# Patient Record
Sex: FEMALE | Race: WHITE | NOT HISPANIC OR LATINO | Employment: FULL TIME | URBAN - METROPOLITAN AREA
[De-identification: names, ages, dates, MRNs, and addresses within clinical notes are randomized per-mention and may not be internally consistent; named-entity substitution may affect disease eponyms.]

---

## 2021-12-08 ENCOUNTER — OFFICE VISIT (OUTPATIENT)
Dept: FAMILY MEDICINE | Facility: CLINIC | Age: 26
End: 2021-12-08
Payer: OTHER GOVERNMENT

## 2021-12-08 VITALS
SYSTOLIC BLOOD PRESSURE: 100 MMHG | HEIGHT: 66 IN | BODY MASS INDEX: 19.77 KG/M2 | DIASTOLIC BLOOD PRESSURE: 65 MMHG | HEART RATE: 74 BPM | OXYGEN SATURATION: 100 % | TEMPERATURE: 98 F | WEIGHT: 123 LBS

## 2021-12-08 DIAGNOSIS — Z11.4 ENCOUNTER FOR SCREENING FOR HIV: ICD-10-CM

## 2021-12-08 DIAGNOSIS — R53.83 FATIGUE, UNSPECIFIED TYPE: ICD-10-CM

## 2021-12-08 DIAGNOSIS — Z00.00 ANNUAL PHYSICAL EXAM: Primary | ICD-10-CM

## 2021-12-08 DIAGNOSIS — Z11.59 ENCOUNTER FOR HEPATITIS C SCREENING TEST FOR LOW RISK PATIENT: ICD-10-CM

## 2021-12-08 PROCEDURE — 99385 PR PREVENTIVE VISIT,NEW,18-39: ICD-10-PCS | Mod: S$GLB,,, | Performed by: NURSE PRACTITIONER

## 2021-12-08 PROCEDURE — 99385 PREV VISIT NEW AGE 18-39: CPT | Mod: S$GLB,,, | Performed by: NURSE PRACTITIONER

## 2023-10-02 DIAGNOSIS — M54.12 BRACHIAL NEURITIS: Primary | ICD-10-CM

## 2023-10-02 DIAGNOSIS — R53.1 ASTHENIA: ICD-10-CM

## 2023-10-19 ENCOUNTER — HOSPITAL ENCOUNTER (OUTPATIENT)
Dept: RADIOLOGY | Facility: HOSPITAL | Age: 28
Discharge: HOME | End: 2023-10-19
Attending: NURSE PRACTITIONER
Payer: OTHER GOVERNMENT

## 2023-10-19 DIAGNOSIS — M54.12 BRACHIAL NEURITIS: ICD-10-CM

## 2023-10-19 DIAGNOSIS — R53.1 ASTHENIA: ICD-10-CM

## 2023-10-19 PROCEDURE — 72141 MRI NECK SPINE W/O DYE: CPT | Mod: TC

## 2023-10-19 PROCEDURE — 72141 MRI NECK SPINE W/O DYE: CPT | Mod: 26,,, | Performed by: RADIOLOGY

## 2023-10-19 PROCEDURE — 72141 MRI CERVICAL SPINE WITHOUT CONTRAST: ICD-10-PCS | Mod: 26,,, | Performed by: RADIOLOGY

## 2023-12-13 ENCOUNTER — OCCUPATIONAL HEALTH (OUTPATIENT)
Dept: URGENT CARE | Facility: CLINIC | Age: 28
End: 2023-12-13

## 2023-12-13 PROCEDURE — 80305 PR COLLECTION ONLY DRUG SCREEN: ICD-10-PCS | Mod: S$GLB,,,

## 2023-12-13 PROCEDURE — 80305 DRUG TEST PRSMV DIR OPT OBS: CPT | Mod: S$GLB,,,

## 2024-04-26 ENCOUNTER — OFFICE VISIT (OUTPATIENT)
Dept: FAMILY MEDICINE | Facility: CLINIC | Age: 29
End: 2024-04-26
Payer: OTHER GOVERNMENT

## 2024-04-26 VITALS
HEIGHT: 66 IN | DIASTOLIC BLOOD PRESSURE: 68 MMHG | SYSTOLIC BLOOD PRESSURE: 118 MMHG | BODY MASS INDEX: 20.23 KG/M2 | HEART RATE: 75 BPM | TEMPERATURE: 98 F | OXYGEN SATURATION: 98 % | WEIGHT: 125.88 LBS

## 2024-04-26 DIAGNOSIS — R23.2 HOT FLASHES: ICD-10-CM

## 2024-04-26 DIAGNOSIS — Z00.00 PHYSICAL EXAM: Primary | ICD-10-CM

## 2024-04-26 PROCEDURE — 99999 PR PBB SHADOW E&M-EST. PATIENT-LVL III: CPT | Mod: PBBFAC,,, | Performed by: FAMILY MEDICINE

## 2024-04-26 PROCEDURE — 99395 PREV VISIT EST AGE 18-39: CPT | Mod: S$PBB,,, | Performed by: FAMILY MEDICINE

## 2024-04-26 PROCEDURE — 99213 OFFICE O/P EST LOW 20 MIN: CPT | Mod: PBBFAC,PN | Performed by: FAMILY MEDICINE

## 2024-04-26 NOTE — PROGRESS NOTES
Subjective     Patient ID: Nena Fong is a 29 y.o. female.    Chief Complaint: Annual Exam    Nena Fong is a 29 y.o. female who presents for physical exam and hot flashes. No tobacco or alcohol use. Exercises daily. 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 Fhx of hypothyroidism (mother). Immunizations are UTD. Pap smear performed last year at [facility], unable to see results. Taking prenatal vitamin currently, trying to conceive. No prior pregnancies. LMP 4/24/2026. Cycle 26-28 days. Has hot flashes during beginning of menstrual cycle. In general is in good health. BMI is 20. No wheezing or SOB. No chest pain or palpitations. No N/V/D. Left shoulder entrapment last fall, did physical therapy, better now with no current concern. Had tongue laceration surgery in 2002. Had wisdom teeth removed.      Review of Systems   Respiratory:  Negative for shortness of breath and wheezing.    Cardiovascular:  Negative for chest pain and palpitations.   Gastrointestinal:  Negative for diarrhea, nausea and vomiting.   All other systems reviewed and are negative.         Objective     Physical Exam  Vitals reviewed.   HENT:      Right Ear: Tympanic membrane normal.      Left Ear: Tympanic membrane normal.      Mouth/Throat:      Mouth: Mucous membranes are moist.   Cardiovascular:      Rate and Rhythm: Normal rate and regular rhythm.      Pulses:           Dorsalis pedis pulses are 2+ on the right side and 2+ on the left side.   Pulmonary:      Breath sounds: Normal breath sounds.   Abdominal:      General: Bowel sounds are normal.      Palpations: Abdomen is soft.      Tenderness: There is no abdominal tenderness.            Assessment and Plan     1. Physical exam  -     CBC Auto Differential; Future; Expected date: 05/10/2024  -     Comprehensive Metabolic Panel; Future; Expected date: 05/10/2024  -     Lipid Panel; Future; Expected date: 05/10/2024  -     T4, Free; Future; Expected date: 04/26/2024  -      TSH; Future; Expected date: 05/10/2024  -     Ambulatory referral/consult to Obstetrics / Gynecology; Future; Expected date: 05/03/2024    2. Hot flashes  -     Follicle Stimulating Hormone; Future; Expected date: 04/26/2024  -     Estradiol; Future; Expected date: 05/10/2024  -     PROGESTERONE; Future; Expected date: 04/26/2024  -     LUTEINIZING HORMONE; Future; Expected date: 04/26/2024        Copy of Pap smear from [facility].  CBC CMP lipids free T4 TSH estrogen FSH progesterone and LH.  Ob gyn referral.         No follow-ups on file.

## 2024-04-30 ENCOUNTER — LAB VISIT (OUTPATIENT)
Dept: LAB | Facility: HOSPITAL | Age: 29
End: 2024-04-30
Attending: FAMILY MEDICINE
Payer: OTHER GOVERNMENT

## 2024-04-30 DIAGNOSIS — R23.2 HOT FLASHES: ICD-10-CM

## 2024-04-30 DIAGNOSIS — Z00.00 PHYSICAL EXAM: ICD-10-CM

## 2024-04-30 LAB
ALBUMIN SERPL BCP-MCNC: 4.5 G/DL (ref 3.5–5.2)
ALP SERPL-CCNC: 46 U/L (ref 55–135)
ALT SERPL W/O P-5'-P-CCNC: 16 U/L (ref 10–44)
ANION GAP SERPL CALC-SCNC: 4 MMOL/L (ref 8–16)
AST SERPL-CCNC: 18 U/L (ref 10–40)
BASOPHILS # BLD AUTO: 0.05 K/UL (ref 0–0.2)
BASOPHILS NFR BLD: 1 % (ref 0–1.9)
BILIRUB SERPL-MCNC: 1.4 MG/DL (ref 0.1–1)
BUN SERPL-MCNC: 17 MG/DL (ref 6–20)
CALCIUM SERPL-MCNC: 9.2 MG/DL (ref 8.7–10.5)
CHLORIDE SERPL-SCNC: 106 MMOL/L (ref 95–110)
CHOLEST SERPL-MCNC: 142 MG/DL (ref 120–199)
CHOLEST/HDLC SERPL: 2.2 {RATIO} (ref 2–5)
CO2 SERPL-SCNC: 27 MMOL/L (ref 23–29)
CREAT SERPL-MCNC: 0.9 MG/DL (ref 0.5–1.4)
DIFFERENTIAL METHOD BLD: ABNORMAL
EOSINOPHIL # BLD AUTO: 0.3 K/UL (ref 0–0.5)
EOSINOPHIL NFR BLD: 5.5 % (ref 0–8)
ERYTHROCYTE [DISTWIDTH] IN BLOOD BY AUTOMATED COUNT: 12.4 % (ref 11.5–14.5)
EST. GFR  (NO RACE VARIABLE): >60 ML/MIN/1.73 M^2
GLUCOSE SERPL-MCNC: 89 MG/DL (ref 70–110)
HCT VFR BLD AUTO: 38 % (ref 37–48.5)
HDLC SERPL-MCNC: 64 MG/DL (ref 40–75)
HDLC SERPL: 45.1 % (ref 20–50)
HGB BLD-MCNC: 12.4 G/DL (ref 12–16)
IMM GRANULOCYTES # BLD AUTO: 0.03 K/UL (ref 0–0.04)
IMM GRANULOCYTES NFR BLD AUTO: 0.6 % (ref 0–0.5)
LDLC SERPL CALC-MCNC: 70.4 MG/DL (ref 63–159)
LYMPHOCYTES # BLD AUTO: 2 K/UL (ref 1–4.8)
LYMPHOCYTES NFR BLD: 40.8 % (ref 18–48)
MCH RBC QN AUTO: 28.8 PG (ref 27–31)
MCHC RBC AUTO-ENTMCNC: 32.6 G/DL (ref 32–36)
MCV RBC AUTO: 88 FL (ref 82–98)
MONOCYTES # BLD AUTO: 0.3 K/UL (ref 0.3–1)
MONOCYTES NFR BLD: 6.9 % (ref 4–15)
NEUTROPHILS # BLD AUTO: 2.2 K/UL (ref 1.8–7.7)
NEUTROPHILS NFR BLD: 45.2 % (ref 38–73)
NONHDLC SERPL-MCNC: 78 MG/DL
NRBC BLD-RTO: 0 /100 WBC
PLATELET # BLD AUTO: 214 K/UL (ref 150–450)
PMV BLD AUTO: 9.4 FL (ref 9.2–12.9)
POTASSIUM SERPL-SCNC: 3.8 MMOL/L (ref 3.5–5.1)
PROT SERPL-MCNC: 7.2 G/DL (ref 6–8.4)
RBC # BLD AUTO: 4.3 M/UL (ref 4–5.4)
SODIUM SERPL-SCNC: 137 MMOL/L (ref 136–145)
T4 FREE SERPL-MCNC: 0.87 NG/DL (ref 0.71–1.51)
TRIGL SERPL-MCNC: 38 MG/DL (ref 30–150)
TSH SERPL DL<=0.005 MIU/L-ACNC: 2.7 UIU/ML (ref 0.34–5.6)
WBC # BLD AUTO: 4.93 K/UL (ref 3.9–12.7)

## 2024-04-30 PROCEDURE — 85025 COMPLETE CBC W/AUTO DIFF WBC: CPT | Performed by: FAMILY MEDICINE

## 2024-04-30 PROCEDURE — 84144 ASSAY OF PROGESTERONE: CPT | Performed by: FAMILY MEDICINE

## 2024-04-30 PROCEDURE — 80053 COMPREHEN METABOLIC PANEL: CPT | Performed by: FAMILY MEDICINE

## 2024-04-30 PROCEDURE — 80061 LIPID PANEL: CPT | Performed by: FAMILY MEDICINE

## 2024-04-30 PROCEDURE — 83001 ASSAY OF GONADOTROPIN (FSH): CPT | Performed by: FAMILY MEDICINE

## 2024-04-30 PROCEDURE — 83002 ASSAY OF GONADOTROPIN (LH): CPT | Performed by: FAMILY MEDICINE

## 2024-04-30 PROCEDURE — 36415 COLL VENOUS BLD VENIPUNCTURE: CPT | Performed by: FAMILY MEDICINE

## 2024-04-30 PROCEDURE — 84439 ASSAY OF FREE THYROXINE: CPT | Performed by: FAMILY MEDICINE

## 2024-04-30 PROCEDURE — 84443 ASSAY THYROID STIM HORMONE: CPT | Performed by: FAMILY MEDICINE

## 2024-04-30 PROCEDURE — 82670 ASSAY OF TOTAL ESTRADIOL: CPT | Performed by: FAMILY MEDICINE

## 2024-05-01 LAB
ESTRADIOL SERPL-MCNC: 31.2 PG/ML
FSH SERPL-ACNC: 8.1 MIU/ML
LH SERPL-ACNC: 10.6 MIU/ML
PROGEST SERPL-MCNC: 0.2 NG/ML

## 2024-07-19 ENCOUNTER — OFFICE VISIT (OUTPATIENT)
Dept: OBSTETRICS AND GYNECOLOGY | Facility: CLINIC | Age: 29
End: 2024-07-19
Payer: OTHER GOVERNMENT

## 2024-07-19 VITALS
HEIGHT: 66 IN | SYSTOLIC BLOOD PRESSURE: 100 MMHG | BODY MASS INDEX: 20.95 KG/M2 | DIASTOLIC BLOOD PRESSURE: 60 MMHG | WEIGHT: 130.38 LBS

## 2024-07-19 DIAGNOSIS — Z00.00 PHYSICAL EXAM: ICD-10-CM

## 2024-07-19 DIAGNOSIS — Z31.41 FERTILITY TESTING: Primary | ICD-10-CM

## 2024-07-19 PROCEDURE — 99213 OFFICE O/P EST LOW 20 MIN: CPT | Mod: PBBFAC,PO | Performed by: GENERAL PRACTICE

## 2024-07-19 PROCEDURE — 99999 PR PBB SHADOW E&M-EST. PATIENT-LVL III: CPT | Mod: PBBFAC,,, | Performed by: GENERAL PRACTICE

## 2024-07-19 PROCEDURE — 99203 OFFICE O/P NEW LOW 30 MIN: CPT | Mod: S$PBB,,, | Performed by: GENERAL PRACTICE

## 2024-07-19 RX ORDER — GUAIFENESIN/EPHEDRINE HCL 200-12.5MG
TABLET ORAL
COMMUNITY

## 2024-07-19 NOTE — PROGRESS NOTES
HISTORY OF THE PRESENT ILLNESS    07/19/2024  Nena Fong is a 29 y.o. here for an infertility evaluation.  She and her  have been TTC for 1yr, maybe a little less, and they have had no significant separations during that time (one month only).  He is [employer] and works at [employer].  Prior to TTC, Nena was on a ARA.    G'sP's: G0  LMP: 21 FIDE  Relationship:  3yrs years and sexually active  Contraception: nothing (desires pregnancy)  PAP Hx: no h/o abnormals  PAP: PAP neg / Date: NOV 2023 - per patient  HPV Vaccine: complete     LABS & RADS   Lab Results   Component Value Date    WBC 4.93 04/30/2024    HGB 12.4 04/30/2024    HCT 38.0 04/30/2024     04/30/2024    MCV 88 04/30/2024      Lab Results   Component Value Date    TSH 2.698 04/30/2024    LABLH 10.6 04/30/2024    FSH 8.1 04/30/2024    ESTRADIOL 31.2 04/30/2024   --> was day 7       GYNECOLOGIC HISTORY  PAP Hx: no h/o abnormals    Genital HSV: no  STD Hx: no past history    Menarche: 13 yoa  Period duration: 6 days  # Heavy Days: 2  Pad/tampon ? on heavy days: 4x  Intermenstrual bleeding: No  Period frequency:regular every 28-30 days    Vasomotor Sxs: yes, typically 4 per day - had been only at night and the week leading up to her period, now is the first 1-2 days of period and during the day  Vaginal dryness: denies    OVULATION / HIRSUTISM:  Ovulatory by regular menses / Acne: Typical amount / Coarse or dark hair growth: No    PAIN / ENDOMETRIOSIS:  Premenstrual spotting: No /  Pain with BM's: Yes - cramping pain and diarrhea day before and day 1 of period  Dysmenorrhea: Yes - starts as soon as bleeding starts  Non-menstrual pelvic pressure/pain: Yes and when ovulating has mild cramps that last till period starts  Dyspareunia: No    OBSTETRIC HISTORY  G0    PAST MEDICAL HISTORY  No past medical history on file.    PAST SURGICAL HISTORY  [place] teeth    ALLERGIES  Review of patient's allergies indicates:  No Known  Allergies    MEDICATIONS  Current Outpatient Medications   Medication Instructions    docosahexaenoic acid (DHA ALGAL-900) 300 mg Cap Oral    prenatal 21-iron fu-folic acid 14 mg iron- 400 mcg Tab Oral     SOCIAL HISTORY  Lives with:   Smoker: non-smoker  Alcohol: denies  Drugs: denies  Domestic Violence: no  Occupation:  dietician    FAMILY HISTORY  BLEEDING or  CLOTTING DISORDERS: none  BREAST CA: PG-mother  UTERINE CA: none  OVARIAN CA: none  COLON CA: none  Birth defects: none / Mental Disabilities: none / Genetic Disorders: none    SPOUSE / PARTNER HISTORY  Name: Rome  Age: 29  Medications: men's MVI  Smoker: non-smoker  EtOH: No  Supplements: Yes - some protein shakes and bars (bought at [employer]), nicotine  Groin injuries or surgeries: No  Prior paternity: No  STD History: No    --------------------------------------------------------------------------------------------------------------    PHYSICAL EXAM  VITALS:  Vitals:    07/19/24 1409   BP: 100/60       GEN = alert/oriented, nad, pleasant  HEENT = sclera anicteric, EOM grossly normal, no apparent acne or hirsutism   = deferred, no concerns    ASSESSMENT AND PLAN:  29 y.o. G0 for fertility evaluation.  At least moderate suspicion for endometriosis.  Ovulatory per regular menses and home OPK's.  Unknown male and tubal factors.  Unclear if hot flashes indicate a problem in setting of monthly menses.  Prior FSH was day 7 (and wnl).    Discussed basic infertility with the patient (provided [facility] handout and directed patient to their website).    LABS: PRL, D3 LH/FSH/E2.  Will check varicella and Rubella titers.    Nena isn't sure today how much more they want to pursue at this time.  Discussed next steps would be pelvic US and HSG (written instructions and expectations reviewed via handout from [facility]) for her.  Also would have  do SA through [facility].    Discussed possibility of endometriosis.  Reviewed certain Dx is with Lx.  Discussed can be a  cause for infertility.    If orders desired for US and HSG may send message in [facility].    OTW RTC niki DEVLIN MD

## 2024-07-19 NOTE — PATIENT INSTRUCTIONS
I've ordered the HSG which will be done at [facility].  Here's your to-do list:      O Call [phone] on day 1 of your next period to schedule the HSG.  It's usually done between days 5 and 10 of your cycle.    O Do not have sex from cycle day 1 until after the HSG.    O Take the antibiotic (doxycycline) the day before, the day of, and the day after your HSG.    O Take over-the-counter ibuprofen (or other pain medicine of your choice) 1 hour before the procedure to minimize cramping.    O Go to REGISTRATION at [facility] about 1.5 hours before your scheduled procedure.  You'll then go to the LAB for a blood pregnancy test.      Good to know:  The procedure should take 10-30 minutes.  It involves a pelvic exam where a dye will be injected into your uterus, and they'll take x-rays.  Bring a panty liner or pad to wear after the procedure.  I'll send you a fact-sheet about HSG's.    Please reach out if you have any questions.  I'll see you in clinic once all of your studies are completed.    Sincerely,  Dr. Kumar

## 2024-07-22 ENCOUNTER — LAB VISIT (OUTPATIENT)
Dept: LAB | Facility: HOSPITAL | Age: 29
End: 2024-07-22
Attending: GENERAL PRACTICE
Payer: OTHER GOVERNMENT

## 2024-07-22 DIAGNOSIS — Z31.41 FERTILITY TESTING: ICD-10-CM

## 2024-07-22 PROCEDURE — 86762 RUBELLA ANTIBODY: CPT | Performed by: GENERAL PRACTICE

## 2024-07-22 PROCEDURE — 86787 VARICELLA-ZOSTER ANTIBODY: CPT | Performed by: GENERAL PRACTICE

## 2024-07-22 PROCEDURE — 36415 COLL VENOUS BLD VENIPUNCTURE: CPT | Performed by: GENERAL PRACTICE

## 2024-07-22 PROCEDURE — 84146 ASSAY OF PROLACTIN: CPT | Performed by: GENERAL PRACTICE

## 2024-07-22 PROCEDURE — 82670 ASSAY OF TOTAL ESTRADIOL: CPT | Performed by: GENERAL PRACTICE

## 2024-07-22 PROCEDURE — 83001 ASSAY OF GONADOTROPIN (FSH): CPT | Performed by: GENERAL PRACTICE

## 2024-07-22 PROCEDURE — 83002 ASSAY OF GONADOTROPIN (LH): CPT | Performed by: GENERAL PRACTICE

## 2024-07-24 LAB
ESTRADIOL SERPL-MCNC: 43.6 PG/ML
FSH SERPL-ACNC: 7.6 MIU/ML
LH SERPL-ACNC: 9.4 MIU/ML
PROLACTIN SERPL-MCNC: 12.7 NG/ML (ref 4.8–33.4)
RUBV IGG SERPL IA-ACNC: 1.87 INDEX
VZV IGG SER IA-ACNC: 677 INDEX

## 2024-07-25 NOTE — PROGRESS NOTES
Nena,    Your results are all normal.  You have immunity to chicken pox (varicella) and Rubella.  Let me know if/when you want to pursue the other studies.    Sincerely,  Dr. Kumar

## 2024-08-21 ENCOUNTER — PATIENT MESSAGE (OUTPATIENT)
Dept: OBSTETRICS AND GYNECOLOGY | Facility: CLINIC | Age: 29
End: 2024-08-21
Payer: OTHER GOVERNMENT